# Patient Record
Sex: MALE | Race: WHITE | Employment: FULL TIME | URBAN - METROPOLITAN AREA
[De-identification: names, ages, dates, MRNs, and addresses within clinical notes are randomized per-mention and may not be internally consistent; named-entity substitution may affect disease eponyms.]

---

## 2023-10-29 ENCOUNTER — OFFICE VISIT (OUTPATIENT)
Age: 38
End: 2023-10-29

## 2023-10-29 VITALS
DIASTOLIC BLOOD PRESSURE: 84 MMHG | HEART RATE: 93 BPM | RESPIRATION RATE: 18 BRPM | OXYGEN SATURATION: 98 % | WEIGHT: 168 LBS | TEMPERATURE: 98.5 F | SYSTOLIC BLOOD PRESSURE: 132 MMHG | BODY MASS INDEX: 22.26 KG/M2 | HEIGHT: 73 IN

## 2023-10-29 DIAGNOSIS — S61.212A LACERATION OF RIGHT MIDDLE FINGER WITHOUT FOREIGN BODY WITHOUT DAMAGE TO NAIL, INITIAL ENCOUNTER: Primary | ICD-10-CM

## 2023-10-29 RX ORDER — MIRTAZAPINE 15 MG/1
15 TABLET, FILM COATED ORAL NIGHTLY
COMMUNITY

## 2023-10-29 NOTE — PROGRESS NOTES
Lauryn Mccullough (:  1985) is a 41 y.o. male,New patient, here for evaluation of the following chief complaint(s):  New Patient (Cut right middle finger open with bottle open.)      SUBJECTIVE:   Lauryn Mccullough sustained laceration of finger 12 hours ago. Nature of injury: cut self with bottle opener. Tetanus vaccination status reviewed: tetanus status unknown to the patient, tetanus re-vaccination not indicated. OBJECTIVE:   Patient appears well, vitals are normal. Laceration 3 cm noted. Description: no foreign bodies, ragged edges. Neurovascular and tendon structures are intact. ASSESSMENT:     Visit Diagnoses and Associated Orders       ORDERS WITHOUT AN ASSOCIATED DIAGNOSIS    mirtazapine (REMERON) 15 MG tablet [[id number]]                 PLAN:   Anesthesia with 1% Lidocaine without Epinephrine. Wound cleansed, debrided of visible foreign material and necrotic tissue, and sutured. Antibiotic ointment and dressing applied. Wound care instructions provided. Observe for any signs of infection or other problems. Return for suture removal in 10 days. LACERATION REPAIR    Date/Time: 10/29/2023 7:16 PM    Performed by: CHPAIN Bautista CNP  Authorized by: CHAPIN Bautista CNP  Body area: upper extremity  Location details: right long finger  Laceration length: 3 (total, multiple lacerations) cm  Contaminated: No.  Tendon involvement: none  Nerve involvement: none  Vascular damage: no  Anesthesia: digital block and local infiltration    Anesthesia:  Local Anesthetic: lidocaine 1% without epinephrine  Anesthetic total: 9 mL    Sedation:  Patient sedated: no    Preparation: Patient was prepped and draped in the usual sterile fashion.   Irrigation solution: saline  Irrigation method: syringe  Amount of cleaning: standard  Debridement: none  Degree of undermining: none  Skin closure: 5-0 nylon  Number of sutures: 8  Technique: simple  Approximation: close  Approximation